# Patient Record
Sex: MALE | Race: WHITE | Employment: FULL TIME | URBAN - METROPOLITAN AREA
[De-identification: names, ages, dates, MRNs, and addresses within clinical notes are randomized per-mention and may not be internally consistent; named-entity substitution may affect disease eponyms.]

---

## 2024-02-27 ENCOUNTER — HOSPITAL ENCOUNTER (EMERGENCY)
Age: 49
Discharge: HOME | End: 2024-02-28
Attending: EMERGENCY MEDICINE
Payer: COMMERCIAL

## 2024-02-27 VITALS
TEMPERATURE: 98.7 F | WEIGHT: 270 LBS | SYSTOLIC BLOOD PRESSURE: 147 MMHG | OXYGEN SATURATION: 96 % | RESPIRATION RATE: 18 BRPM | HEIGHT: 72 IN | DIASTOLIC BLOOD PRESSURE: 95 MMHG | HEART RATE: 87 BPM | BODY MASS INDEX: 36.57 KG/M2

## 2024-02-27 DIAGNOSIS — M54.32 SCIATICA OF LEFT SIDE: Primary | ICD-10-CM

## 2024-02-27 PROCEDURE — 99283 EMERGENCY DEPT VISIT LOW MDM: CPT

## 2024-02-28 LAB — GLUCOSE BLD-MCNC: 153 MG/DL (ref 70–99)

## 2024-02-28 PROCEDURE — 6370000000 HC RX 637 (ALT 250 FOR IP): Performed by: PHYSICIAN ASSISTANT

## 2024-02-28 PROCEDURE — 82962 GLUCOSE BLOOD TEST: CPT

## 2024-02-28 PROCEDURE — 6360000002 HC RX W HCPCS: Performed by: PHYSICIAN ASSISTANT

## 2024-02-28 RX ORDER — TIZANIDINE 4 MG/1
4 TABLET ORAL 3 TIMES DAILY PRN
Qty: 30 TABLET | Refills: 0 | Status: SHIPPED | OUTPATIENT
Start: 2024-02-28

## 2024-02-28 RX ORDER — TIZANIDINE 4 MG/1
4 TABLET ORAL ONCE
Status: COMPLETED | OUTPATIENT
Start: 2024-02-28 | End: 2024-02-28

## 2024-02-28 RX ORDER — DEXAMETHASONE 4 MG/1
8 TABLET ORAL ONCE
Status: COMPLETED | OUTPATIENT
Start: 2024-02-28 | End: 2024-02-28

## 2024-02-28 RX ORDER — DEXAMETHASONE 6 MG/1
6 TABLET ORAL
Qty: 5 TABLET | Refills: 0 | Status: SHIPPED | OUTPATIENT
Start: 2024-02-28 | End: 2024-03-04

## 2024-02-28 RX ADMIN — DEXAMETHASONE 8 MG: 4 TABLET ORAL at 00:33

## 2024-02-28 RX ADMIN — TIZANIDINE 4 MG: 4 TABLET ORAL at 00:33

## 2024-02-28 NOTE — ED PROVIDER NOTES
clubbing.   Musculoskeletal: Good range of motion in all major joints as observed. No major deformities noted.   Neurologic: Alert & oriented x 3, Normal motor function with 5/5 muscle strength testing to bilateral lower extremities, Normal sensory function, CN II-XII grossly intact as tested, No focal deficits noted.  Negative straight leg test bilaterally  Psychiatric: Affect normal, Judgment normal, Mood normal.   EKG  [unfilled]  RADIOLOGY  Labs Reviewed   POCT GLUCOSE - Abnormal; Notable for the following components:       Result Value    POC Glucose 153 (*)     All other components within normal limits   POCT GLUCOSE     I personally reviewed the images. The radiologist's interpretation reveals:  Last Imaging results   No orders to display       MEDS GIVEN IN ED:  Medications   dexAMETHasone (DECADRON) tablet 8 mg (8 mg Oral Given 2/28/24 0033)   tiZANidine (ZANAFLEX) tablet 4 mg (4 mg Oral Given 2/28/24 0033)     COURSE & MEDICAL DECISION MAKING  49-year-old male presents emergency department with complaints of ongoing pain in left lateral lumbar region that radiates down his leg.  Has been evaluated by urgent care facility, primary care provider, and orthopedic spine surgery prior to this evaluation this evening.  He is currently on gabapentin therapy and attempting to use ibuprofen at home.  He has no red flag findings such as recent trauma, loss of bowel control, loss of bladder control, saddle anesthesia, or IV drug use.  His initial vital signs here demonstrate mildly elevated blood pressure.  On exam he has no midline tenderness but does have reproducible tenderness to palpation of left lateral lumbar region with normal strength and sensation to bilateral lower extremities.  He currently has negative straight leg test bilaterally.  At this time I discussed with the patient that he does not have red flag findings here to necessitate MRI of the spine although he likely will need this to confirm disease

## 2025-07-13 ENCOUNTER — APPOINTMENT (OUTPATIENT)
Dept: GENERAL RADIOLOGY | Age: 50
End: 2025-07-13
Payer: COMMERCIAL

## 2025-07-13 ENCOUNTER — HOSPITAL ENCOUNTER (EMERGENCY)
Age: 50
Discharge: HOME | End: 2025-07-13
Payer: COMMERCIAL

## 2025-07-13 VITALS
OXYGEN SATURATION: 97 % | HEART RATE: 81 BPM | WEIGHT: 235 LBS | RESPIRATION RATE: 20 BRPM | TEMPERATURE: 97.5 F | SYSTOLIC BLOOD PRESSURE: 113 MMHG | BODY MASS INDEX: 31.83 KG/M2 | HEIGHT: 72 IN | DIASTOLIC BLOOD PRESSURE: 69 MMHG

## 2025-07-13 DIAGNOSIS — L03.113 CELLULITIS OF RIGHT UPPER EXTREMITY: Primary | ICD-10-CM

## 2025-07-13 PROCEDURE — 90715 TDAP VACCINE 7 YRS/> IM: CPT

## 2025-07-13 PROCEDURE — 99284 EMERGENCY DEPT VISIT MOD MDM: CPT

## 2025-07-13 PROCEDURE — 6370000000 HC RX 637 (ALT 250 FOR IP)

## 2025-07-13 PROCEDURE — 6360000002 HC RX W HCPCS

## 2025-07-13 PROCEDURE — 73070 X-RAY EXAM OF ELBOW: CPT

## 2025-07-13 PROCEDURE — 90471 IMMUNIZATION ADMIN: CPT

## 2025-07-13 RX ORDER — METHYLPREDNISOLONE 4 MG/1
TABLET ORAL
Qty: 1 KIT | Refills: 0 | Status: SHIPPED | OUTPATIENT
Start: 2025-07-13 | End: 2025-07-19

## 2025-07-13 RX ORDER — PREDNISONE 20 MG/1
60 TABLET ORAL ONCE
Status: COMPLETED | OUTPATIENT
Start: 2025-07-13 | End: 2025-07-13

## 2025-07-13 RX ORDER — IBUPROFEN 600 MG/1
600 TABLET, FILM COATED ORAL ONCE
Status: COMPLETED | OUTPATIENT
Start: 2025-07-13 | End: 2025-07-13

## 2025-07-13 RX ORDER — CEPHALEXIN 500 MG/1
500 CAPSULE ORAL 2 TIMES DAILY
Qty: 14 CAPSULE | Refills: 0 | Status: SHIPPED | OUTPATIENT
Start: 2025-07-13 | End: 2025-07-20

## 2025-07-13 RX ORDER — IBUPROFEN 600 MG/1
600 TABLET, FILM COATED ORAL 3 TIMES DAILY PRN
Qty: 30 TABLET | Refills: 0 | Status: SHIPPED | OUTPATIENT
Start: 2025-07-13

## 2025-07-13 RX ADMIN — PREDNISONE 60 MG: 20 TABLET ORAL at 10:58

## 2025-07-13 RX ADMIN — IBUPROFEN 600 MG: 600 TABLET, FILM COATED ORAL at 10:58

## 2025-07-13 RX ADMIN — TETANUS TOXOID, REDUCED DIPHTHERIA TOXOID AND ACELLULAR PERTUSSIS VACCINE, ADSORBED 0.5 ML: 5; 2.5; 8; 8; 2.5 SUSPENSION INTRAMUSCULAR at 10:58

## 2025-07-13 ASSESSMENT — PAIN SCALES - GENERAL
PAINLEVEL_OUTOF10: 4
PAINLEVEL_OUTOF10: 3

## 2025-07-13 ASSESSMENT — PAIN - FUNCTIONAL ASSESSMENT
PAIN_FUNCTIONAL_ASSESSMENT: 0-10
PAIN_FUNCTIONAL_ASSESSMENT: 0-10

## 2025-07-13 ASSESSMENT — PAIN DESCRIPTION - LOCATION: LOCATION: ELBOW

## 2025-07-13 ASSESSMENT — PAIN DESCRIPTION - ORIENTATION: ORIENTATION: RIGHT

## 2025-07-13 NOTE — ED PROVIDER NOTES
I personally saw Juan Diego Cerna and made/approved the management plan and take responsibility for the patient management.    In brief, patient is a 50-year-old with a history of diabetes that presented to the emergency department today with right elbow pain and redness.  Patient states has recently been working under his car and may have scratched his elbow.  Seemed more swollen and more red today, so come to the emergency department for evaluation.  Does not know his last tetanus.    Focused exam revealed   General- no acute distress, alert, cooperative  Skin -intact, no rashes  Respiratory -no respiratory distress, speaking full sentences  MSK: No acute deformities  Abdomen: Nondistended  Skin: Erythema right elbow, warm to touch, normal range of motion of elbow without pain.  Nontender.  Mild swelling.    ED course: Patient had x-ray of elbow which was grossly normal.  Will treat as cellulitis.  I do not suspect intra articular joint infection.    Patient seen in collaboration with Ann Marie BROOKS, see her note for additional details of ED course.    Final Impression:  1. Cellulitis of right upper extremity      DISPOSITION Decision To Discharge 07/13/2025 11:49:31 AM   DISPOSITION CONDITION Stable           All diagnostic, treatment, and disposition decisions were made by myself in conjunction with the advanced practice provider.    For all further details of the patient's emergency department visit, please see the advanced practice provider's documentation.    Comment: Please note this report has been produced using speech recognition software and may contain errors related to that system including errors in grammar, punctuation, and spelling, as well as words and phrases that may be inappropriate. If there are any questions or concerns please feel free to contact the dictating provider for clarification.       Davion Sheppard,   07/14/25 0990    
cellulitis need to update tetanus.  Will treat with Keflex.  Strict return precaution if redness or edema extends.  Patient is not tachycardic not febrile.  Did provide patient with Motrin and prednisone while in ED.  X-rays were completed and negative for any acute findings.     Provided patient with Medrol Dosepak Motrin and Keflex for minimal cellulitis treatment.  Educated on RICE.  Educated patient on need for orthopedic evaluation for increased or continued pain.  Educated on strict return precautions for redness warmth fever systemic signs of illness or increased pain to injury location.    Disposition Considerations (tests considered but not done, Admit vs D/C, Shared Decision Making, Pt Expectation of Test or Tx.): Patient discharged in stable condition with close outpatient follow-up       The patient tolerated their visit well.  The patient and / or the family were informed of the results of any tests, a time was given to answer questions, a plan was proposed and they agreed with plan.    I am the Primary Clinician of Record.  FINAL IMPRESSION      1. Cellulitis of right upper extremity          DISPOSITION/PLAN     DISPOSITION Decision To Discharge 07/13/2025 11:49:31 AM   DISPOSITION CONDITION Stable           PATIENT REFERRED TO:  Riaz Blevins DO  [address] [address] [phone]    Schedule an appointment as soon as possible for a visit   As needed for orthopedic follow-up if no resolution of symptoms    Dorothy Barnhart, WENDI - CNP  [address] [facility] [address] [phone]    Schedule an appointment as soon as possible for a visit   For primary care follow-up      DISCHARGE MEDICATIONS:  New Prescriptions    CEPHALEXIN (KEFLEX) 500 MG CAPSULE    Take 1 capsule by mouth 2 times daily for 7 days    IBUPROFEN (ADVIL;MOTRIN) 600 MG TABLET    Take 1 tablet by mouth 3 times daily as needed for Pain    METHYLPREDNISOLONE (MEDROL DOSEPACK) 4

## 2025-07-13 NOTE — ED TRIAGE NOTES
Patient presents to ED with complaint of right elbow pain and swelling for 3 days.  No known injury.

## 2025-07-13 NOTE — DISCHARGE INSTRUCTIONS
It was my pleasure taking care of you in the emergency department today.  If we prescribed any medications, please be sure to take them as prescribed. Continue taking medications as prescribed by your PCP unless we discussed otherwise.   Please be sure to follow-up with your PCP within the next 1-2 weeks.  Please don't hesitate to return to the emergency department in case of any worsening symptoms.  Wish you a speedy recovery.  Most sincerely,    Ann Marie Fermin CNP